# Patient Record
Sex: MALE | Race: WHITE
[De-identification: names, ages, dates, MRNs, and addresses within clinical notes are randomized per-mention and may not be internally consistent; named-entity substitution may affect disease eponyms.]

---

## 2020-07-20 ENCOUNTER — HOSPITAL ENCOUNTER (EMERGENCY)
Dept: HOSPITAL 77 - KA.ED | Age: 50
Discharge: HOME | End: 2020-07-20
Payer: COMMERCIAL

## 2020-07-20 VITALS — HEART RATE: 78 BPM

## 2020-07-20 VITALS — DIASTOLIC BLOOD PRESSURE: 106 MMHG | SYSTOLIC BLOOD PRESSURE: 162 MMHG

## 2020-07-20 DIAGNOSIS — Z87.891: ICD-10-CM

## 2020-07-20 DIAGNOSIS — S91.112A: Primary | ICD-10-CM

## 2020-07-20 DIAGNOSIS — Z79.899: ICD-10-CM

## 2020-07-20 DIAGNOSIS — F41.9: ICD-10-CM
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
## 2020-07-20 DIAGNOSIS — I10: ICD-10-CM
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
## 2023-10-29 ENCOUNTER — HOSPITAL ENCOUNTER (EMERGENCY)
Dept: HOSPITAL 77 - KA.ED | Age: 53
Discharge: HOME | End: 2023-10-29
Payer: MEDICARE

## 2023-10-29 VITALS — DIASTOLIC BLOOD PRESSURE: 78 MMHG | SYSTOLIC BLOOD PRESSURE: 118 MMHG | HEART RATE: 79 BPM

## 2023-10-29 DIAGNOSIS — L03.115: Primary | ICD-10-CM

## 2023-10-29 DIAGNOSIS — Z79.82: ICD-10-CM

## 2023-10-29 DIAGNOSIS — Z79.899: ICD-10-CM

## 2023-10-29 DIAGNOSIS — I10: ICD-10-CM

## 2024-08-16 ENCOUNTER — HOSPITAL ENCOUNTER (INPATIENT)
Dept: HOSPITAL 77 - KA.ED | Age: 54
LOS: 5 days | Discharge: HOME | DRG: 948 | End: 2024-08-21
Attending: INTERNAL MEDICINE | Admitting: INTERNAL MEDICINE
Payer: MEDICARE

## 2024-08-16 DIAGNOSIS — Z87.891: ICD-10-CM

## 2024-08-16 DIAGNOSIS — Z98.890: ICD-10-CM

## 2024-08-16 DIAGNOSIS — M10.9: ICD-10-CM

## 2024-08-16 DIAGNOSIS — I10: ICD-10-CM

## 2024-08-16 DIAGNOSIS — F32.A: ICD-10-CM

## 2024-08-16 DIAGNOSIS — R53.1: Primary | ICD-10-CM

## 2024-08-16 DIAGNOSIS — D72.829: ICD-10-CM

## 2024-08-16 DIAGNOSIS — M46.20: ICD-10-CM

## 2024-08-16 DIAGNOSIS — R60.0: ICD-10-CM

## 2024-08-16 DIAGNOSIS — G89.29: ICD-10-CM

## 2024-08-16 DIAGNOSIS — R53.81: ICD-10-CM

## 2024-08-16 DIAGNOSIS — G62.9: ICD-10-CM

## 2024-08-16 DIAGNOSIS — G72.81: ICD-10-CM

## 2024-08-16 DIAGNOSIS — E78.00: ICD-10-CM

## 2024-08-16 DIAGNOSIS — E66.9: ICD-10-CM

## 2024-08-16 DIAGNOSIS — M19.90: ICD-10-CM

## 2024-08-16 DIAGNOSIS — Z79.82: ICD-10-CM

## 2024-08-16 DIAGNOSIS — Z79.899: ICD-10-CM

## 2024-08-16 DIAGNOSIS — F41.1: ICD-10-CM

## 2024-08-16 LAB
ALBUMIN SERPL-MCNC: 2.56 G/DL (ref 3.4–5)
ALP SERPL-CCNC: 86 U/L (ref 46–116)
ALT SERPL-CCNC: 10 U/L (ref 14–63)
ANION GAP SERPL CALC-SCNC: 12.7 MMOL/L (ref 5–15)
APPEARANCE UR: CLEAR
AST SERPL-CCNC: < 9 U/L (ref 15–37)
BASOPHILS # BLD AUTO: 0.05 10^3/UL (ref 0–0.1)
BASOPHILS NFR BLD AUTO: 0.5 % (ref 0–1)
BILIRUB SERPL-MCNC: 0.2 MG/DL (ref 0.2–1)
BILIRUB UR STRIP-MCNC: NEGATIVE MG/DL
BUN SERPL-MCNC: 7 MG/DL (ref 7–18)
CALCIUM SERPL-MCNC: 9.5 MG/DL (ref 8.7–10.3)
CHLORIDE SERPL-SCNC: 104 MMOL/L (ref 98–107)
CO2 SERPL-SCNC: 29.2 MMOL/L (ref 21–32)
COLOR UR: YELLOW
CREAT CL 24H UR+SERPL-VRATE: (no result) ML/MIN
CREAT SERPL-MCNC: 0.56 MG/DL (ref 0.51–1.17)
EGFRCR SERPLBLD CKD-EPI 2021: 117 ML/MIN (ref 60–?)
EOSINOPHIL # BLD AUTO: 0.66 10^3/UL (ref 0.1–0.3)
EOSINOPHIL NFR BLD AUTO: 6.5 % (ref 1–3)
ERYTHROCYTE [DISTWIDTH] IN BLOOD BY AUTOMATED COUNT: 15.1 % (ref 11.5–14.5)
GLUCOSE SERPL-MCNC: 104 MG/DL (ref 70–140)
GLUCOSE UR STRIP-MCNC: NEGATIVE MG/DL
HCT VFR BLD AUTO: 32.7 % (ref 40–52)
HGB BLD-MCNC: 10 G/DL (ref 13–17)
IMM GRANULOCYTES # BLD: 0.04 10^3/UL (ref 0–0.5)
IMM GRANULOCYTES NFR BLD: 0.4 % (ref 0–5)
KETONES UR STRIP-MCNC: NEGATIVE MG/DL
LACTATE SERPL-SCNC: 1.9 MMOL/L (ref 0.4–2)
LYMPHOCYTES # BLD AUTO: 1.77 10^3/UL (ref 1–4)
LYMPHOCYTES NFR BLD AUTO: 17.5 % (ref 20–40)
MCH RBC QN AUTO: 27.6 PG (ref 27–31)
MCHC RBC AUTO-ENTMCNC: 30.6 G/DL (ref 32–36)
MCHC RBC AUTO-ENTMCNC: 90.3 FL (ref 82–92)
MONOCYTES # BLD AUTO: 0.88 10^3/UL (ref 0.1–0.8)
MONOCYTES NFR BLD AUTO: 8.7 % (ref 2–8)
NEUTROPHILS # BLD AUTO: 6.74 10^3/UL (ref 2.5–7)
NEUTROPHILS NFR BLD AUTO: 66.4 % (ref 50–70)
NITRITE UR QL: NEGATIVE
PH UR STRIP: 7 [PH] (ref 5–9)
PLATELET # BLD AUTO: 510 10^3/UL (ref 150–400)
PMV BLD AUTO: 8.4 FL (ref 7.4–10.4)
POTASSIUM SERPL-SCNC: 3.9 MMOL/L (ref 3.5–5.1)
PROT SERPL-MCNC: 7.8 G/DL (ref 6.4–8.2)
PROT UR STRIP-MCNC: NEGATIVE MG/DL
RBC # BLD AUTO: 3.62 10^6/UL (ref 4.5–6)
RBC UR QL: NEGATIVE
SODIUM SERPL-SCNC: 142 MMOL/L (ref 136–145)
SP GR UR STRIP: 1.01 (ref 1–1.03)
UROBILINOGEN UR STRIP-ACNC: 0.2 E.U./DL (ref 0.2–1)
WBC # BLD AUTO: 10.14 10^3/UL (ref 5–10)

## 2024-08-16 PROCEDURE — A6212 FOAM DRG <=16 SQ IN W/BORDER: HCPCS

## 2024-08-16 RX ADMIN — HYDROCODONE BITARTRATE AND ACETAMINOPHEN PRN TAB: 5; 325 TABLET ORAL at 21:08

## 2024-08-16 RX ADMIN — DIVALPROEX SODIUM SCH MG: 250 TABLET, DELAYED RELEASE ORAL at 21:10

## 2024-08-17 LAB
ALBUMIN SERPL-MCNC: 2.25 G/DL (ref 3.4–5)
ALP SERPL-CCNC: 80 U/L (ref 46–116)
ALT SERPL-CCNC: 15 U/L (ref 14–63)
ANION GAP SERPL CALC-SCNC: 10.4 MMOL/L (ref 5–15)
AST SERPL-CCNC: 8 U/L (ref 15–37)
BILIRUB SERPL-MCNC: 0.1 MG/DL (ref 0.2–1)
BUN SERPL-MCNC: 7 MG/DL (ref 7–18)
CALCIUM SERPL-MCNC: 9.2 MG/DL (ref 8.7–10.3)
CHLORIDE SERPL-SCNC: 106 MMOL/L (ref 98–107)
CK SERPL-CCNC: 24 U/L (ref 26–276)
CO2 SERPL-SCNC: 30.4 MMOL/L (ref 21–32)
CREAT CL 24H UR+SERPL-VRATE: 185.14 ML/MIN
CREAT SERPL-MCNC: 0.56 MG/DL (ref 0.51–1.17)
EGFRCR SERPLBLD CKD-EPI 2021: 117 ML/MIN (ref 60–?)
ERYTHROCYTE [DISTWIDTH] IN BLOOD BY AUTOMATED COUNT: 15.3 % (ref 11.5–14.5)
GLUCOSE SERPL-MCNC: 99 MG/DL (ref 70–140)
HCT VFR BLD AUTO: 30 % (ref 40–52)
HGB BLD-MCNC: 9.3 G/DL (ref 13–17)
MAGNESIUM SERPL-MCNC: 1.8 MG/DL (ref 1.8–2.4)
MCH RBC QN AUTO: 27.9 PG (ref 27–31)
MCHC RBC AUTO-ENTMCNC: 31 G/DL (ref 32–36)
MCHC RBC AUTO-ENTMCNC: 90.1 FL (ref 82–92)
PLATELET # BLD AUTO: 506 10^3/UL (ref 150–400)
PMV BLD AUTO: 8.6 FL (ref 7.4–10.4)
POTASSIUM SERPL-SCNC: 3.8 MMOL/L (ref 3.5–5.1)
PROT SERPL-MCNC: 7 G/DL (ref 6.4–8.2)
RBC # BLD AUTO: 3.33 10^6/UL (ref 4.5–6)
SODIUM SERPL-SCNC: 143 MMOL/L (ref 136–145)
WBC # BLD AUTO: 6.54 10^3/UL (ref 5–10)

## 2024-08-21 VITALS — SYSTOLIC BLOOD PRESSURE: 123 MMHG | HEART RATE: 96 BPM | DIASTOLIC BLOOD PRESSURE: 90 MMHG

## 2024-10-09 ENCOUNTER — HOSPITAL ENCOUNTER (EMERGENCY)
Dept: HOSPITAL 77 - KA.ED | Age: 54
LOS: 1 days | Discharge: HOME | End: 2024-10-10
Payer: MEDICARE

## 2024-10-09 DIAGNOSIS — F41.0: Primary | ICD-10-CM

## 2024-10-09 DIAGNOSIS — E78.00: ICD-10-CM

## 2024-10-09 DIAGNOSIS — E66.9: ICD-10-CM

## 2024-10-09 DIAGNOSIS — F14.90: ICD-10-CM

## 2024-10-09 DIAGNOSIS — Z79.899: ICD-10-CM

## 2024-10-09 DIAGNOSIS — I10: ICD-10-CM

## 2024-10-09 PROCEDURE — 99285 EMERGENCY DEPT VISIT HI MDM: CPT

## 2024-10-09 PROCEDURE — 96376 TX/PRO/DX INJ SAME DRUG ADON: CPT

## 2024-10-09 PROCEDURE — 96361 HYDRATE IV INFUSION ADD-ON: CPT

## 2024-10-09 PROCEDURE — 80053 COMPREHEN METABOLIC PANEL: CPT

## 2024-10-09 PROCEDURE — 96374 THER/PROPH/DIAG INJ IV PUSH: CPT

## 2024-10-09 PROCEDURE — 84484 ASSAY OF TROPONIN QUANT: CPT

## 2024-10-09 PROCEDURE — 85025 COMPLETE CBC W/AUTO DIFF WBC: CPT

## 2024-10-09 RX ADMIN — LORAZEPAM ONE MG: 2 INJECTION INTRAMUSCULAR; INTRAVENOUS at 23:54

## 2024-10-09 RX ADMIN — Medication PRN ML: at 23:50

## 2024-10-10 VITALS — SYSTOLIC BLOOD PRESSURE: 128 MMHG | DIASTOLIC BLOOD PRESSURE: 76 MMHG | HEART RATE: 81 BPM

## 2024-10-10 LAB
ALBUMIN SERPL-MCNC: 3.62 G/DL (ref 3.4–5)
ALP SERPL-CCNC: 101 U/L (ref 46–116)
ALT SERPL-CCNC: 26 U/L (ref 14–63)
ANION GAP SERPL CALC-SCNC: 20.2 MMOL/L (ref 5–15)
AST SERPL-CCNC: 19 U/L (ref 15–37)
BASOPHILS # BLD AUTO: 0.02 10^3/UL (ref 0–0.1)
BASOPHILS NFR BLD AUTO: 0.2 % (ref 0–1)
BILIRUB SERPL-MCNC: 0.2 MG/DL (ref 0.2–1)
BUN SERPL-MCNC: 23 MG/DL (ref 7–18)
CALCIUM SERPL-MCNC: 9.1 MG/DL (ref 8.7–10.3)
CHLORIDE SERPL-SCNC: 101 MMOL/L (ref 98–107)
CO2 SERPL-SCNC: 23.1 MMOL/L (ref 21–32)
CREAT CL 24H UR+SERPL-VRATE: 116 ML/MIN
CREAT SERPL-MCNC: 0.86 MG/DL (ref 0.51–1.17)
EGFRCR SERPLBLD CKD-EPI 2021: 102 ML/MIN (ref 60–?)
EOSINOPHIL # BLD AUTO: 0.15 10^3/UL (ref 0.1–0.3)
EOSINOPHIL NFR BLD AUTO: 1.4 % (ref 1–3)
ERYTHROCYTE [DISTWIDTH] IN BLOOD BY AUTOMATED COUNT: 16.8 % (ref 11.5–14.5)
GLUCOSE SERPL-MCNC: 134 MG/DL (ref 70–140)
HCT VFR BLD AUTO: 39.1 % (ref 40–52)
HGB BLD-MCNC: 12.6 G/DL (ref 13–17)
IMM GRANULOCYTES # BLD: 0.02 10^3/UL (ref 0–0.5)
IMM GRANULOCYTES NFR BLD: 0.2 % (ref 0–5)
LYMPHOCYTES # BLD AUTO: 1.84 10^3/UL (ref 1–4)
LYMPHOCYTES NFR BLD AUTO: 17 % (ref 20–40)
MCH RBC QN AUTO: 28 PG (ref 27–31)
MCHC RBC AUTO-ENTMCNC: 32.2 G/DL (ref 32–36)
MCHC RBC AUTO-ENTMCNC: 86.9 FL (ref 82–92)
MONOCYTES # BLD AUTO: 0.83 10^3/UL (ref 0.1–0.8)
MONOCYTES NFR BLD AUTO: 7.6 % (ref 2–8)
NEUTROPHILS # BLD AUTO: 7.99 10^3/UL (ref 2.5–7)
NEUTROPHILS NFR BLD AUTO: 73.6 % (ref 50–70)
PLATELET # BLD AUTO: 371 10^3/UL (ref 150–400)
PMV BLD AUTO: 9.2 FL (ref 7.4–10.4)
POTASSIUM SERPL-SCNC: 4.3 MMOL/L (ref 3.5–5.1)
PROT SERPL-MCNC: 7.6 G/DL (ref 6.4–8.2)
RBC # BLD AUTO: 4.5 10^6/UL (ref 4.5–6)
SODIUM SERPL-SCNC: 140 MMOL/L (ref 136–145)
WBC # BLD AUTO: 10.85 10^3/UL (ref 5–10)

## 2024-10-10 RX ADMIN — LORAZEPAM ONE MG: 2 INJECTION INTRAMUSCULAR; INTRAVENOUS at 00:31

## 2024-10-10 RX ADMIN — LORAZEPAM ONE: 2 INJECTION INTRAMUSCULAR; INTRAVENOUS at 00:32

## 2024-10-12 ENCOUNTER — HOSPITAL ENCOUNTER (EMERGENCY)
Dept: HOSPITAL 77 - KA.ED | Age: 54
Discharge: HOME | End: 2024-10-12
Payer: MEDICARE

## 2024-10-12 VITALS — SYSTOLIC BLOOD PRESSURE: 125 MMHG | DIASTOLIC BLOOD PRESSURE: 84 MMHG | HEART RATE: 68 BPM

## 2024-10-12 DIAGNOSIS — I10: ICD-10-CM

## 2024-10-12 DIAGNOSIS — K21.9: ICD-10-CM

## 2024-10-12 DIAGNOSIS — E66.9: ICD-10-CM

## 2024-10-12 DIAGNOSIS — Z79.899: ICD-10-CM

## 2024-10-12 DIAGNOSIS — F41.0: Primary | ICD-10-CM

## 2024-10-12 DIAGNOSIS — E78.00: ICD-10-CM

## 2024-10-12 RX ADMIN — LORAZEPAM ONE MG: 2 INJECTION INTRAMUSCULAR; INTRAVENOUS at 20:10

## 2024-10-14 RX ADMIN — LORAZEPAM ONE: 2 INJECTION INTRAMUSCULAR; INTRAVENOUS at 11:50

## 2025-05-18 ENCOUNTER — HOSPITAL ENCOUNTER (EMERGENCY)
Dept: HOSPITAL 77 - KA.ED | Age: 55
Discharge: HOME | End: 2025-05-18
Payer: COMMERCIAL

## 2025-05-18 VITALS — SYSTOLIC BLOOD PRESSURE: 123 MMHG | HEART RATE: 93 BPM | DIASTOLIC BLOOD PRESSURE: 90 MMHG

## 2025-05-18 DIAGNOSIS — E87.1: ICD-10-CM

## 2025-05-18 DIAGNOSIS — E66.9: ICD-10-CM

## 2025-05-18 DIAGNOSIS — K30: ICD-10-CM

## 2025-05-18 DIAGNOSIS — I10: ICD-10-CM

## 2025-05-18 DIAGNOSIS — K21.9: ICD-10-CM

## 2025-05-18 DIAGNOSIS — E78.00: ICD-10-CM

## 2025-05-18 DIAGNOSIS — F41.0: Primary | ICD-10-CM

## 2025-05-18 DIAGNOSIS — Z79.899: ICD-10-CM

## 2025-05-18 LAB
ALBUMIN SERPL-MCNC: 3.47 G/DL (ref 3.4–5)
ANION GAP SERPL CALC-SCNC: 13.3 MMOL/L (ref 5–15)
BASOPHILS # BLD AUTO: 0.02 10^3/UL (ref 0–0.1)
BASOPHILS NFR BLD AUTO: 0.2 % (ref 0–1)
BILIRUB SERPL-MCNC: 0.3 MG/DL (ref 0.2–1)
CALCIUM SERPL-MCNC: 9.8 MG/DL (ref 8.7–10.3)
CO2 SERPL-SCNC: 26.7 MMOL/L (ref 21–32)
CREAT CL 24H UR+SERPL-VRATE: 126.51 ML/MIN
CREAT SERPL-MCNC: 0.81 MG/DL (ref 0.51–1.17)
EOSINOPHIL # BLD AUTO: 0.11 10^3/UL (ref 0.1–0.3)
EOSINOPHIL NFR BLD AUTO: 1.1 % (ref 1–3)
ERYTHROCYTE [DISTWIDTH] IN BLOOD BY AUTOMATED COUNT: 15.4 % (ref 11.5–14.5)
HCT VFR BLD AUTO: 35.6 % (ref 40–52)
IMM GRANULOCYTES # BLD: 0.05 10^3/UL (ref 0–0.04)
IMM GRANULOCYTES NFR BLD: 0.5 % (ref 0–0.4)
LYMPHOCYTES # BLD AUTO: 1.39 10^3/UL (ref 1–4)
LYMPHOCYTES NFR BLD AUTO: 13.4 % (ref 20–40)
MCH RBC QN AUTO: 31.8 PG (ref 27–31)
MCHC RBC AUTO-ENTMCNC: 33.7 G/DL (ref 32–36)
MCHC RBC AUTO-ENTMCNC: 94.4 FL (ref 82–92)
MONOCYTES # BLD AUTO: 1.08 10^3/UL (ref 0.1–0.8)
MONOCYTES NFR BLD AUTO: 10.4 % (ref 2–8)
NEUTROPHILS # BLD AUTO: 7.69 10^3/UL (ref 2.5–7)
NEUTROPHILS NFR BLD AUTO: 74.4 % (ref 50–70)
PLATELET # BLD AUTO: 290 10^3/UL (ref 150–400)
PMV BLD AUTO: 8.8 FL (ref 7.4–10.4)
RBC # BLD AUTO: 3.77 10^6/UL (ref 4.5–6)
WBC # BLD AUTO: 10.34 10^3/UL (ref 5–10)

## 2025-05-18 RX ADMIN — LORAZEPAM ONE MG: 2 INJECTION INTRAMUSCULAR; INTRAVENOUS at 20:25

## 2025-05-18 RX ADMIN — DIPHENHYDRAMINE HYDROCHLORIDE ONE MG: 50 INJECTION, SOLUTION INTRAMUSCULAR; INTRAVENOUS at 20:28

## 2025-05-18 RX ADMIN — ALUMINUM HYDROXIDE, MAGNESIUM HYDROXIDE, AND SIMETHICONE ONE ML: 200; 200; 20 SUSPENSION ORAL at 20:27

## 2025-06-27 ENCOUNTER — HOSPITAL ENCOUNTER (EMERGENCY)
Dept: HOSPITAL 77 - KA.ED | Age: 55
Discharge: HOME | End: 2025-06-27
Payer: MEDICARE

## 2025-06-27 VITALS — HEART RATE: 72 BPM

## 2025-06-27 VITALS — SYSTOLIC BLOOD PRESSURE: 114 MMHG | HEART RATE: 91 BPM | DIASTOLIC BLOOD PRESSURE: 78 MMHG

## 2025-06-27 VITALS — DIASTOLIC BLOOD PRESSURE: 82 MMHG | SYSTOLIC BLOOD PRESSURE: 138 MMHG
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
## 2025-06-27 DIAGNOSIS — I10: ICD-10-CM

## 2025-06-27 DIAGNOSIS — Z79.899: ICD-10-CM

## 2025-06-27 DIAGNOSIS — G47.00: ICD-10-CM

## 2025-06-27 DIAGNOSIS — K21.9: ICD-10-CM

## 2025-06-27 DIAGNOSIS — F41.9: ICD-10-CM

## 2025-06-27 DIAGNOSIS — S51.811A: Primary | ICD-10-CM

## 2025-06-27 DIAGNOSIS — E78.00: ICD-10-CM

## 2025-06-27 DIAGNOSIS — I10: Primary | ICD-10-CM

## 2025-06-27 LAB
ACANTHOCYTES BLD QL SMEAR: (no result)
AGRAN PLATELETS BLD QL SMEAR: (no result)
ALBUMIN SERPL-MCNC: 3.67 G/DL (ref 3.4–5)
ALP SERPL-CCNC: 58 U/L (ref 46–116)
ALT SERPL-CCNC: 21 U/L (ref 14–63)
ANION GAP SERPL CALC-SCNC: 14.4 MMOL/L (ref 5–15)
ANISOCYTOSIS BLD QL SMEAR: (no result)
AST SERPL-CCNC: 14 U/L (ref 15–37)
AUER BODIES BLD QL SMEAR: (no result)
BASO STIPL BLD QL SMEAR: (no result)
BASOPHILS # BLD AUTO: 0.04 10^3/UL (ref 0–0.1)
BASOPHILS NFR BLD AUTO: 0.6 % (ref 0–1)
BILIRUB SERPL-MCNC: 0.6 MG/DL (ref 0.2–1)
BNP SERPL-MCNC: 19 PG/ML (ref 0–100)
BUN SERPL-MCNC: 9 MG/DL (ref 7–18)
BURR CELLS BLD QL SMEAR: (no result)
CABOT RINGS BLD QL SMEAR: (no result)
CALCIUM SERPL-MCNC: 8.7 MG/DL (ref 8.7–10.3)
CHLORIDE SERPL-SCNC: 94 MMOL/L (ref 98–107)
CO2 SERPL-SCNC: 26.4 MMOL/L (ref 21–32)
CREAT CL 24H UR+SERPL-VRATE: (no result) ML/MIN
CREAT SERPL-MCNC: 0.77 MG/DL (ref 0.51–1.17)
CRP SERPL-MCNC: < 0.5 MG/DL (ref 0–0.5)
DACRYOCYTES BLD QL SMEAR: (no result)
DOHLE BOD BLD QL SMEAR: (no result)
EGFRCR SERPLBLD CKD-EPI 2021: 106 ML/MIN (ref 60–?)
ELLIPTOCYTES BLD QL SMEAR: (no result)
EOSINOPHIL # BLD AUTO: 0.1 10^3/UL (ref 0.1–0.3)
EOSINOPHIL NFR BLD AUTO: 1.4 % (ref 1–3)
ERYTHROCYTE [DISTWIDTH] IN BLOOD BY AUTOMATED COUNT: 13.9 % (ref 11.5–14.5)
GIANT PLATELETS BLD QL SMEAR: (no result)
GLUCOSE SERPL-MCNC: 91 MG/DL (ref 70–140)
HCT VFR BLD AUTO: 35.3 % (ref 40–52)
HEINZ BOD BLD QL SMEAR: (no result)
HELMET CELLS BLD QL SMEAR: (no result)
HGB BLD-MCNC: 11.9 G/DL (ref 13–17)
HOWELL-JOLLY BOD BLD QL SMEAR: (no result)
HYPOCHROMIA BLD QL SMEAR: (no result)
IMM GRANULOCYTES # BLD: 0.01 10^3/UL (ref 0–0.04)
IMM GRANULOCYTES NFR BLD: 0.1 % (ref 0–0.4)
LYMPH ABN # BLD MANUAL: (no result) 10*3/UL
LYMPHOCYTES # BLD AUTO: 1.65 10^3/UL (ref 1–4)
LYMPHOCYTES NFR BLD AUTO: 23.7 % (ref 20–40)
Lab: (no result)
MACROCYTES BLD QL SMEAR: (no result)
MCH RBC QN AUTO: 32.7 PG (ref 27–31)
MCHC RBC AUTO-ENTMCNC: 33.7 G/DL (ref 32–36)
MCHC RBC AUTO-ENTMCNC: 97 FL (ref 82–92)
MICROCYTES BLD QL SMEAR: (no result)
MONOCYTES # BLD AUTO: 0.7 10^3/UL (ref 0.1–0.8)
MONOCYTES NFR BLD AUTO: 10 % (ref 2–8)
NEUTROPHILS # BLD AUTO: 4.47 10^3/UL (ref 2.5–7)
NEUTROPHILS NFR BLD AUTO: 64.2 % (ref 50–70)
NEUTS HYPERSEG BLD QL SMEAR: (no result)
OVALOCYTES BLD QL SMEAR: (no result)
PAPPENHEIMER BOD BLD QL SMEAR: (no result)
PARASITE IDENTIFIED IN BLOOD BY LIGHT MICROSCOPY: (no result)
PATH REV BLD -IMP: (no result)
PELGER HUET CELLS BLD QL SMEAR: (no result)
PELGER HUET CELLS BLD QL SMEAR: (no result)
PLASMACOID LYMPHS NFR BLD MANUAL: (no result) %
PLATELET # BLD AUTO: 283 10^3/UL (ref 150–400)
PLATELET BLD QL SMEAR: (no result)
PLATELET CLUMP BLD QL SMEAR: (no result)
PLATELET SATEL BLD QL SMEAR: (no result)
PMV BLD AUTO: 8.6 FL (ref 7.4–10.4)
POIKILOCYTOSIS BLD QL SMEAR: (no result)
POLYCHROMASIA BLD QL SMEAR: (no result)
POTASSIUM SERPL-SCNC: 3.8 MMOL/L (ref 3.5–5.1)
PROT SERPL-MCNC: 6.8 G/DL (ref 6.4–8.2)
RBC # BLD AUTO: 3.64 10^6/UL (ref 4.5–6)
ROULEAUX BLD QL SMEAR: (no result)
SCHISTOCYTES BLD QL SMEAR: (no result)
SICKLE CELLS BLD QL SMEAR: (no result)
SMUDGE CELLS BLD QL SMEAR: (no result)
SODIUM SERPL-SCNC: 131 MMOL/L (ref 136–145)
SPHEROCYTES BLD QL SMEAR: (no result)
STOMATOCYTES BLD QL SMEAR: (no result)
TARGETS BLD QL SMEAR: (no result)
TOXIC GRANULES BLD QL SMEAR: (no result)
VARIANT LYMPHS BLD QL SMEAR: (no result)
VARIANT LYMPHS BLD QL SMEAR: (no result)
VARIANT LYMPHS NFR BLD MANUAL: (no result) %
WBC # BLD AUTO: 6.97 10^3/UL (ref 5–10)
WBC NRBC COR # BLD AUTO: (no result) K/UL (ref 5–10)

## 2025-06-27 RX ADMIN — IOPAMIDOL ONE ML: 755 INJECTION, SOLUTION INTRAVENOUS at 12:08

## 2025-06-27 RX ADMIN — SODIUM CHLORIDE SCH MLS/HR: 9 INJECTION, SOLUTION INTRAVENOUS at 12:08

## 2025-06-27 RX ADMIN — LORAZEPAM ONE MG: 2 INJECTION INTRAMUSCULAR; INTRAVENOUS at 12:03

## 2025-06-27 RX ADMIN — NEOMYCIN AND POLYMYXIN B SULFATES AND BACITRACIN ZINC ONE EACH: 400; 3.5; 5 OINTMENT TOPICAL at 16:48

## 2025-06-27 RX ADMIN — SODIUM CHLORIDE ONE MLS/HR: 0.9 INJECTION, SOLUTION INTRAVENOUS at 10:29

## 2025-06-28 ENCOUNTER — HOSPITAL ENCOUNTER (EMERGENCY)
Dept: HOSPITAL 77 - KA.ED | Age: 55
LOS: 1 days | Discharge: HOME | End: 2025-06-29
Payer: MEDICARE

## 2025-06-28 VITALS — DIASTOLIC BLOOD PRESSURE: 86 MMHG | SYSTOLIC BLOOD PRESSURE: 135 MMHG | HEART RATE: 83 BPM

## 2025-06-28 DIAGNOSIS — E66.9: ICD-10-CM

## 2025-06-28 DIAGNOSIS — M19.90: ICD-10-CM

## 2025-06-28 DIAGNOSIS — E78.00: ICD-10-CM

## 2025-06-28 DIAGNOSIS — I10: ICD-10-CM

## 2025-06-28 DIAGNOSIS — F41.0: Primary | ICD-10-CM

## 2025-06-28 DIAGNOSIS — Z79.899: ICD-10-CM

## 2025-06-28 RX ADMIN — LORAZEPAM ONE MG: 2 INJECTION INTRAMUSCULAR; INTRAVENOUS at 23:34
